# Patient Record
Sex: MALE | Race: WHITE | NOT HISPANIC OR LATINO | Employment: FULL TIME | URBAN - NONMETROPOLITAN AREA
[De-identification: names, ages, dates, MRNs, and addresses within clinical notes are randomized per-mention and may not be internally consistent; named-entity substitution may affect disease eponyms.]

---

## 2019-01-11 ENCOUNTER — HOSPITAL ENCOUNTER (EMERGENCY)
Facility: OTHER | Age: 34
Discharge: HOME | End: 2019-01-11
Attending: PHYSICIAN ASSISTANT | Admitting: PHYSICIAN ASSISTANT

## 2019-01-11 VITALS
HEIGHT: 74 IN | HEART RATE: 113 BPM | TEMPERATURE: 98.2 F | BODY MASS INDEX: 25.67 KG/M2 | RESPIRATION RATE: 17 BRPM | WEIGHT: 200 LBS | SYSTOLIC BLOOD PRESSURE: 114 MMHG | DIASTOLIC BLOOD PRESSURE: 86 MMHG | OXYGEN SATURATION: 98 %

## 2019-01-11 DIAGNOSIS — R31.9 HEMATURIA: ICD-10-CM

## 2019-01-11 DIAGNOSIS — R10.32 ABDOMINAL PAIN, LEFT LOWER QUADRANT: ICD-10-CM

## 2019-01-11 LAB
ALBUMIN SERPL-MCNC: 4.5 G/DL (ref 3.5–5.7)
ALBUMIN UR-MCNC: NEGATIVE MG/DL
ALP SERPL-CCNC: 47 U/L (ref 34–104)
ALT SERPL W P-5'-P-CCNC: 11 U/L (ref 7–52)
ANION GAP SERPL CALCULATED.3IONS-SCNC: 5 MMOL/L (ref 3–14)
APPEARANCE UR: CLEAR
APTT PPP: 40 SEC (ref 29–50)
AST SERPL W P-5'-P-CCNC: 13 U/L (ref 13–39)
BASOPHILS # BLD AUTO: 0.1 10E9/L (ref 0–0.2)
BASOPHILS NFR BLD AUTO: 0.9 %
BILIRUB SERPL-MCNC: 1.2 MG/DL (ref 0.3–1)
BILIRUB UR QL STRIP: NEGATIVE
BUN SERPL-MCNC: 14 MG/DL (ref 7–25)
C TRACH DNA SPEC QL PROBE+SIG AMP: NOT DETECTED
CALCIUM SERPL-MCNC: 9.6 MG/DL (ref 8.6–10.3)
CHLORIDE SERPL-SCNC: 103 MMOL/L (ref 98–107)
CO2 SERPL-SCNC: 33 MMOL/L (ref 21–31)
COLOR UR AUTO: YELLOW
CREAT SERPL-MCNC: 0.93 MG/DL (ref 0.7–1.3)
DIFFERENTIAL METHOD BLD: NORMAL
EOSINOPHIL # BLD AUTO: 0.3 10E9/L (ref 0–0.7)
EOSINOPHIL NFR BLD AUTO: 3.6 %
ERYTHROCYTE [DISTWIDTH] IN BLOOD BY AUTOMATED COUNT: 12 % (ref 10–15)
GFR SERPL CREATININE-BSD FRML MDRD: >90 ML/MIN/{1.73_M2}
GLUCOSE SERPL-MCNC: 93 MG/DL (ref 70–105)
GLUCOSE UR STRIP-MCNC: NEGATIVE MG/DL
HCT VFR BLD AUTO: 50.3 % (ref 40–53)
HGB BLD-MCNC: 17.3 G/DL (ref 13.3–17.7)
HGB UR QL STRIP: NEGATIVE
IMM GRANULOCYTES # BLD: 0 10E9/L (ref 0–0.4)
IMM GRANULOCYTES NFR BLD: 0.3 %
INR PPP: 1.05 (ref 0–1.3)
KETONES UR STRIP-MCNC: NEGATIVE MG/DL
LACTATE SERPL-SCNC: 1.4 MMOL/L (ref 0.5–2.2)
LEUKOCYTE ESTERASE UR QL STRIP: NEGATIVE
LIPASE SERPL-CCNC: 24 U/L (ref 11–82)
LYMPHOCYTES # BLD AUTO: 2.3 10E9/L (ref 0.8–5.3)
LYMPHOCYTES NFR BLD AUTO: 31.2 %
MCH RBC QN AUTO: 31.1 PG (ref 26.5–33)
MCHC RBC AUTO-ENTMCNC: 34.4 G/DL (ref 31.5–36.5)
MCV RBC AUTO: 90 FL (ref 78–100)
MONOCYTES # BLD AUTO: 0.6 10E9/L (ref 0–1.3)
MONOCYTES NFR BLD AUTO: 8.4 %
N GONORRHOEA DNA SPEC QL PROBE+SIG AMP: NOT DETECTED
NEUTROPHILS # BLD AUTO: 4.2 10E9/L (ref 1.6–8.3)
NEUTROPHILS NFR BLD AUTO: 55.6 %
NITRATE UR QL: NEGATIVE
PH UR STRIP: 7 PH (ref 5–9)
PLATELET # BLD AUTO: 241 10E9/L (ref 150–450)
POTASSIUM SERPL-SCNC: 4.2 MMOL/L (ref 3.5–5.1)
PROT SERPL-MCNC: 7.3 G/DL (ref 6.4–8.9)
RBC # BLD AUTO: 5.57 10E12/L (ref 4.4–5.9)
SODIUM SERPL-SCNC: 141 MMOL/L (ref 134–144)
SOURCE: NORMAL
SP GR UR STRIP: 1.01 (ref 1–1.03)
SPECIMEN SOURCE: NORMAL
UROBILINOGEN UR STRIP-ACNC: 0.2 EU/DL (ref 0.2–1)
WBC # BLD AUTO: 7.5 10E9/L (ref 4–11)

## 2019-01-11 PROCEDURE — 87491 CHLMYD TRACH DNA AMP PROBE: CPT | Performed by: PHYSICIAN ASSISTANT

## 2019-01-11 PROCEDURE — 81003 URINALYSIS AUTO W/O SCOPE: CPT | Performed by: PHYSICIAN ASSISTANT

## 2019-01-11 PROCEDURE — 99282 EMERGENCY DEPT VISIT SF MDM: CPT | Mod: Z6 | Performed by: PHYSICIAN ASSISTANT

## 2019-01-11 PROCEDURE — 36415 COLL VENOUS BLD VENIPUNCTURE: CPT | Performed by: PHYSICIAN ASSISTANT

## 2019-01-11 PROCEDURE — 83690 ASSAY OF LIPASE: CPT | Performed by: PHYSICIAN ASSISTANT

## 2019-01-11 PROCEDURE — 99283 EMERGENCY DEPT VISIT LOW MDM: CPT | Performed by: PHYSICIAN ASSISTANT

## 2019-01-11 PROCEDURE — 85610 PROTHROMBIN TIME: CPT | Performed by: PHYSICIAN ASSISTANT

## 2019-01-11 PROCEDURE — 85025 COMPLETE CBC W/AUTO DIFF WBC: CPT | Performed by: PHYSICIAN ASSISTANT

## 2019-01-11 PROCEDURE — 83605 ASSAY OF LACTIC ACID: CPT | Performed by: PHYSICIAN ASSISTANT

## 2019-01-11 PROCEDURE — 85730 THROMBOPLASTIN TIME PARTIAL: CPT | Performed by: PHYSICIAN ASSISTANT

## 2019-01-11 PROCEDURE — 87591 N.GONORRHOEAE DNA AMP PROB: CPT | Performed by: PHYSICIAN ASSISTANT

## 2019-01-11 PROCEDURE — 80053 COMPREHEN METABOLIC PANEL: CPT | Performed by: PHYSICIAN ASSISTANT

## 2019-01-11 ASSESSMENT — ENCOUNTER SYMPTOMS
DIARRHEA: 0
VOMITING: 0
FLANK PAIN: 0
HEMATURIA: 1
NAUSEA: 0
DIFFICULTY URINATING: 0
DYSURIA: 0
ABDOMINAL PAIN: 1

## 2019-01-11 ASSESSMENT — MIFFLIN-ST. JEOR
SCORE: 1911.94
SCORE: 1911.94

## 2019-01-11 NOTE — ED PROVIDER NOTES
"  History     Chief Complaint   Patient presents with     Abdominal Pain     Hematuria     HPI  Lux Sommer is a 33 year old male who presents to the ED today with a chief complaint of abdominal pain and hematuria.  Patient reports these had intermittent abdominal pain located in his left lower quadrant for the past 3 weeks.  Patient reports that for the last week he is noticed a slight amount of blood in his urine as well as in his pants.  Patient reports a similar past history and he was diagnosed with a UTI at that time.  Patient was recently  and reports an increase in alcohol use as well as unprotected sexual intercourse.  Patient would like to be tested for STDs at this time.    Problem List:    There are no active problems to display for this patient.       Past Medical History:    History reviewed. No pertinent past medical history.    Past Surgical History:    History reviewed. No pertinent surgical history.    Family History:    History reviewed. No pertinent family history.    Social History:  Marital Status:   [2]  Social History     Tobacco Use     Smoking status: Current Every Day Smoker     Packs/day: 1.00     Years: 1.00     Pack years: 1.00     Types: Cigarettes     Smokeless tobacco: Never Used   Substance Use Topics     Alcohol use: No     Drug use: Unknown     Types: Other     Comment: Drug use: No        Medications:      No current outpatient medications on file.      Review of Systems   Gastrointestinal: Positive for abdominal pain. Negative for diarrhea, nausea and vomiting.   Genitourinary: Positive for hematuria. Negative for difficulty urinating, dysuria, flank pain, scrotal swelling and testicular pain.   All other systems reviewed and are negative.      Physical Exam   BP: 130/88  Pulse: 113  Heart Rate: 112  Temp: 97.9  F (36.6  C)  Resp: 16  Height: 188 cm (6' 2\")  Weight: 90.7 kg (200 lb)  SpO2: 98 %      Physical Exam   Constitutional: He appears well-developed and " well-nourished. No distress.   HENT:   Head: Normocephalic and atraumatic.   Eyes: Conjunctivae and EOM are normal. Pupils are equal, round, and reactive to light. No scleral icterus.   Neck: Normal range of motion. Neck supple.   Cardiovascular: Normal rate, regular rhythm and normal heart sounds.   No murmur heard.  Pulmonary/Chest: Effort normal and breath sounds normal. No respiratory distress.   Abdominal: Soft. Bowel sounds are normal. There is no tenderness.   Genitourinary: Penis normal. No penile tenderness.   Musculoskeletal: Normal range of motion. He exhibits no deformity.   Lymphadenopathy:     He has no cervical adenopathy.   Neurological: He is alert.   Skin: Skin is warm and dry. No rash noted. He is not diaphoretic.   Psychiatric: He has a normal mood and affect. His behavior is normal. Judgment and thought content normal.       ED Course        Procedures               Critical Care time:  none               Results for orders placed or performed during the hospital encounter of 01/11/19 (from the past 24 hour(s))   CBC with platelets differential   Result Value Ref Range    WBC 7.5 4.0 - 11.0 10e9/L    RBC Count 5.57 4.4 - 5.9 10e12/L    Hemoglobin 17.3 13.3 - 17.7 g/dL    Hematocrit 50.3 40.0 - 53.0 %    MCV 90 78 - 100 fl    MCH 31.1 26.5 - 33.0 pg    MCHC 34.4 31.5 - 36.5 g/dL    RDW 12.0 10.0 - 15.0 %    Platelet Count 241 150 - 450 10e9/L    Diff Method Automated Method     % Neutrophils 55.6 %    % Lymphocytes 31.2 %    % Monocytes 8.4 %    % Eosinophils 3.6 %    % Basophils 0.9 %    % Immature Granulocytes 0.3 %    Absolute Neutrophil 4.2 1.6 - 8.3 10e9/L    Absolute Lymphocytes 2.3 0.8 - 5.3 10e9/L    Absolute Monocytes 0.6 0.0 - 1.3 10e9/L    Absolute Eosinophils 0.3 0.0 - 0.7 10e9/L    Absolute Basophils 0.1 0.0 - 0.2 10e9/L    Abs Immature Granulocytes 0.0 0 - 0.4 10e9/L   INR   Result Value Ref Range    INR 1.05 0 - 1.3   Partial thromboplastin time   Result Value Ref Range    PTT 40  29 - 50 sec   Comprehensive metabolic panel   Result Value Ref Range    Sodium 141 134 - 144 mmol/L    Potassium 4.2 3.5 - 5.1 mmol/L    Chloride 103 98 - 107 mmol/L    Carbon Dioxide 33 (H) 21 - 31 mmol/L    Anion Gap 5 3 - 14 mmol/L    Glucose 93 70 - 105 mg/dL    Urea Nitrogen 14 7 - 25 mg/dL    Creatinine 0.93 0.70 - 1.30 mg/dL    GFR Estimate >90 >60 mL/min/[1.73_m2]    GFR Estimate If Black >90 >60 mL/min/[1.73_m2]    Calcium 9.6 8.6 - 10.3 mg/dL    Bilirubin Total 1.2 (H) 0.3 - 1.0 mg/dL    Albumin 4.5 3.5 - 5.7 g/dL    Protein Total 7.3 6.4 - 8.9 g/dL    Alkaline Phosphatase 47 34 - 104 U/L    ALT 11 7 - 52 U/L    AST 13 13 - 39 U/L   Lipase   Result Value Ref Range    Lipase 24 11 - 82 U/L   Lactic acid   Result Value Ref Range    Lactic Acid 1.4 0.5 - 2.2 mmol/L   *UA reflex to Microscopic   Result Value Ref Range    Color Urine Yellow     Appearance Urine Clear     Glucose Urine Negative NEG^Negative mg/dL    Bilirubin Urine Negative NEG^Negative    Ketones Urine Negative NEG^Negative mg/dL    Specific Gravity Urine 1.010 1.000 - 1.030    Blood Urine Negative NEG^Negative    pH Urine 7.0 5.0 - 9.0 pH    Protein Albumin Urine Negative NEG^Negative mg/dL    Urobilinogen Urine 0.2 0.2 - 1.0 EU/dL    Nitrite Urine Negative NEG^Negative    Leukocyte Esterase Urine Negative NEG^Negative    Source Midstream Urine    GC/Chlamydia by PCR - HI,   Result Value Ref Range    Specimen Source Midstream Urine     Neisseria gonorrhoreae PCR Not Detected NDET^Not Detected    Chlamydia Trachomatis PCR Not Detected NDET^Not Detected       Medications - No data to display    Assessments & Plan (with Medical Decision Making)   Patient seen and examined.  Patient is nontoxic-appearing no acute distress.  Heart, lung, bowel sounds normal.  Abdomen soft nontender palpation, nondistended.  Patient normal-appearing genitalia with no pain to palpation.  Patient was concerned about possible STDs and did report that he recently had  a UTI diagnosed approximately 1/2 years prior.  Patient also reports an increase in alcohol use over the last few months.  Patient will have basic abdominal lab work including urinalysis.    Patient had a fairly unremarkable workup.  Patient had a negative urinalysis for infection.  Gonorrhea chlamydia not detected.  There is no blood seen in patient's urine.  Patient has what appears to be a nonsurgical abdomen at this time.  A referral will be placed for patient follow-up with urology.  Strict return precautions given.  Patient understood and agree with plan patient was discharged.    Stefano Collins PA-C    I have reviewed the nursing notes.    I have reviewed the findings, diagnosis, plan and need for follow up with the patient.          Medication List      There are no discharge medications for this visit.         Final diagnoses:   Abdominal pain, left lower quadrant   Hematuria       1/11/2019   [facility]     Stefano Collins PA  01/11/19 [id number]

## 2019-01-11 NOTE — DISCHARGE INSTRUCTIONS
Get plenty of fluids and rest.  We will call you with results of your outstanding testing.  I recommend that you try conservative treatment such as Tylenol ibuprofen for your abdominal pain.  A referral was placed for you to follow-up with urology next week, keep and attend that appointment.  Return to the ED if your symptoms are worsening.

## 2019-01-11 NOTE — ED AVS SNAPSHOT
[facility]  [address]  [place] [address]  Phone:  [phone]  Fax:  [phone]                                    Lux Sommer   MRN: [id number]    Department:  [facility]   Date of Visit:  1/11/2019           After Visit Summary Signature Page    I have received my discharge instructions, and my questions have been answered. I have discussed any challenges I see with this plan with the nurse or doctor.    ..........................................................................................................................................  Patient/Patient Representative Signature      ..........................................................................................................................................  Patient Representative Print Name and Relationship to Patient    ..................................................               ................................................  Date                                   Time    ..........................................................................................................................................  Reviewed by Signature/Title    ...................................................              ..............................................  Date                                               Time          22EPIC Rev 08/18

## 2019-01-11 NOTE — ED TRIAGE NOTES
Patient arrived to ED with abdominal pain x3 weeks and hematuria x1 week.  Patient states the pain has been intermittent but getting worse. Patient denies painful urination.

## 2019-01-11 NOTE — ED TRIAGE NOTES
Patient complaining of abdominal pain that has been ongoing for 3 weeks in [facility], denies nausea or vomiting.   Patient also complaining of fatigue.

## 2019-01-11 NOTE — ED NOTES
Patient notified that he will be called with his test results as well as his follow up apt.  Pt denies any questions or concerns upon d/c.

## 2019-03-06 ENCOUNTER — HOSPITAL ENCOUNTER (EMERGENCY)
Facility: OTHER | Age: 34
Discharge: HOME | End: 2019-03-06
Attending: FAMILY MEDICINE | Admitting: FAMILY MEDICINE
Payer: OTHER MISCELLANEOUS

## 2019-03-06 VITALS
WEIGHT: 200 LBS | HEIGHT: 74 IN | OXYGEN SATURATION: 98 % | TEMPERATURE: 98.2 F | SYSTOLIC BLOOD PRESSURE: 129 MMHG | DIASTOLIC BLOOD PRESSURE: 82 MMHG | BODY MASS INDEX: 25.67 KG/M2 | RESPIRATION RATE: 15 BRPM

## 2019-03-06 DIAGNOSIS — S61.210A LACERATION OF RIGHT INDEX FINGER WITHOUT FOREIGN BODY WITHOUT DAMAGE TO NAIL, INITIAL ENCOUNTER: ICD-10-CM

## 2019-03-06 PROCEDURE — 99282 EMERGENCY DEPT VISIT SF MDM: CPT | Mod: 25 | Performed by: FAMILY MEDICINE

## 2019-03-06 PROCEDURE — 99282 EMERGENCY DEPT VISIT SF MDM: CPT | Mod: Z6 | Performed by: FAMILY MEDICINE

## 2019-03-06 PROCEDURE — 12001 RPR S/N/AX/GEN/TRNK 2.5CM/<: CPT | Performed by: FAMILY MEDICINE

## 2019-03-06 PROCEDURE — 27210282 ZZH ADHESIVE DERMABOND SKIN: Performed by: FAMILY MEDICINE

## 2019-03-06 PROCEDURE — 12001 RPR S/N/AX/GEN/TRNK 2.5CM/<: CPT | Mod: Z6 | Performed by: FAMILY MEDICINE

## 2019-03-06 ASSESSMENT — ENCOUNTER SYMPTOMS
FEVER: 0
ABDOMINAL PAIN: 0
WOUND: 1
DIFFICULTY URINATING: 0
NECK STIFFNESS: 0
HEADACHES: 0
CONFUSION: 0
EYE REDNESS: 0
SHORTNESS OF BREATH: 0
COLOR CHANGE: 0
ARTHRALGIAS: 0

## 2019-03-06 ASSESSMENT — MIFFLIN-ST. JEOR: SCORE: 1911.94

## 2019-03-06 NOTE — DISCHARGE INSTRUCTIONS
Mr. Sommer,                 Your laceration on your right index finger was repaired with Dermabond. This will peel off on its own in 5-10 days. Avoid any lotions, ointments or soaps to the Dermabond for the next week. Use ibuprofen as needed for pain and swelling.    Sylvain Carcamo, MS3

## 2019-03-06 NOTE — ED AVS SNAPSHOT
[facility]  [address]  [place] [address]  Phone:  [phone]  Fax:  [phone]                                    Lux Sommer   MRN: [id number]    Department:  [facility]   Date of Visit:  3/6/2019           After Visit Summary Signature Page    I have received my discharge instructions, and my questions have been answered. I have discussed any challenges I see with this plan with the nurse or doctor.    ..........................................................................................................................................  Patient/Patient Representative Signature      ..........................................................................................................................................  Patient Representative Print Name and Relationship to Patient    ..................................................               ................................................  Date                                   Time    ..........................................................................................................................................  Reviewed by Signature/Title    ...................................................              ..............................................  Date                                               Time          22EPIC Rev 08/18

## 2019-03-06 NOTE — ED TRIAGE NOTES
"ED Nursing Triage Note (General)   ________________________________    Lux Sommer is a 33 year old Male that presents to triage private car  With history of  Cutting  His hand when he slipped and had a piece of duct work in his hand cutting it reported by patient   Significant symptoms had onset 3 hour(s) ago.  /82   Temp 98.2  F (36.8  C)   Resp 15   Ht 1.88 m (6' 2\")   Wt 90.7 kg (200 lb)   SpO2 98%   BMI 25.68 kg/m  t  Patient appears alert , in mild distress., and cooperative behavior.    GCS Total = 15  Airway: intact  Breathing noted as Normal.  Circulation Normal  Skin normal  Action taken:  To room       PRE HOSPITAL PRIOR LIVING SITUATION Spouse and Children  "

## 2019-03-06 NOTE — ED PROVIDER NOTES
"  History     Chief Complaint   Patient presents with     Laceration     HPI  Lux Sommer is a 33 year old male who presents with a right 2nd digit laceration between the MCP and PIP joints. He states that he was carrying a piece of new ductwork sheet metal when he slipped on the ice and fell. The sheet metal slipped and cut his finger. He states that it bled profusely and he was able to see underlying tissue. Denies any other injuries from the fall.      Allergies:  No Known Allergies    Problem List:    There are no active problems to display for this patient.       Past Medical History:    History reviewed. No pertinent past medical history.    Past Surgical History:    History reviewed. No pertinent surgical history.    Family History:    History reviewed. No pertinent family history.    Social History:  Marital Status:   [2]  Social History     Tobacco Use     Smoking status: Current Every Day Smoker     Packs/day: 0.50     Years: 1.00     Pack years: 0.50     Types: Cigarettes     Smokeless tobacco: Never Used   Substance Use Topics     Alcohol use: Yes     Comment: occasionally 2/week     Drug use: No     Types: Other     Comment: Drug use: No        Medications:      No current outpatient medications on file.      Review of Systems   Constitutional: Negative for fever.   HENT: Negative for congestion.    Eyes: Negative for redness.   Respiratory: Negative for shortness of breath.    Cardiovascular: Negative for chest pain.   Gastrointestinal: Negative for abdominal pain.   Genitourinary: Negative for difficulty urinating.   Musculoskeletal: Negative for arthralgias and neck stiffness.   Skin: Positive for wound. Negative for color change.   Neurological: Negative for headaches.   Psychiatric/Behavioral: Negative for confusion.       Physical Exam   BP: 129/82  Heart Rate: 96  Temp: 98.2  F (36.8  C)  Resp: 15  Height: 188 cm (6' 2\")  Weight: 90.7 kg (200 lb)  SpO2: 98 %      Physical Exam "   Constitutional: He appears well-developed and well-nourished. No distress.   HENT:   Head: Normocephalic and atraumatic.   Eyes: Conjunctivae are normal. No scleral icterus.   Neck: Neck supple.   Pulmonary/Chest: Effort normal.   Abdominal: Soft. There is no tenderness.   Musculoskeletal: He exhibits no deformity.   Full strength and ROM present in 2nd digit of right hand.    Lymphadenopathy:     He has no cervical adenopathy.   Neurological: He is alert.   Skin: Skin is warm and dry. No rash noted. He is not diaphoretic.   2 cm laceration on the right 2nd digit between the MCP and PIP joint. Bleeding controlled. Without evidence of foreign body   Psychiatric: He has a normal mood and affect.       ED Course       SUBJECTIVE:   33 year old male sustained laceration of finger 2 hours ago. Nature of injury: Fell while carrying sheet metal. Tetanus vaccination status reviewed: last tetanus booster within 10 years, tetanus re-vaccination not indicated.     OBJECTIVE:   Patient appears well, vitals are normal. Laceration 2 cm noted.  Description: clean wound edges, no foreign bodies. Neurovascular and tendon structures are intact.    ASSESSMENT:   Laceration as described.    PLAN:   Wound cleansed and irrigated with sterile saline. Laceration edges approximated with dermabond. Wound care instructions provided.  Observe for any signs of infection or other problems.     Critical Care time:  none  No results found for this or any previous visit (from the past 24 hour(s)).    Medications - No data to display    Assessments & Plan (with Medical Decision Making)     I have reviewed the nursing notes.    I have reviewed the findings, diagnosis, plan and need for follow up with the patient.       Medication List      There are no discharge medications for this visit.         Final diagnoses:   Laceration of right index finger without foreign body without damage to nail, initial encounter       Laceration without evidence of  foreign body. Edges closed and approximated with Dermabond. Do not suspect any injuries to tendons or ligaments in the digit with his exam, suspicion for development of future infection is low. Was advised to follow up should any signs or symptoms of infection occur: fevers, purulent drainage, erythema or swelling. Advised to used ibuprofen for pain and swelling.    Sylvain Carcamo, MS3      Note written by Sylvain BURCH3 [facility],  I reviewed it and I concur. In addition patient was seen and examined by myself including both history and physical examination. My findings are reflected in medical student's note above.      3/6/2019   [facility]  03/06/19 [id number]

## 2020-01-04 ENCOUNTER — HOSPITAL ENCOUNTER (EMERGENCY)
Facility: OTHER | Age: 35
Discharge: HOME | End: 2020-01-04
Attending: FAMILY MEDICINE | Admitting: FAMILY MEDICINE

## 2020-01-04 VITALS
BODY MASS INDEX: 25.67 KG/M2 | OXYGEN SATURATION: 98 % | HEIGHT: 74 IN | SYSTOLIC BLOOD PRESSURE: 116 MMHG | HEART RATE: 95 BPM | RESPIRATION RATE: 18 BRPM | TEMPERATURE: 96.7 F | DIASTOLIC BLOOD PRESSURE: 85 MMHG | WEIGHT: 200 LBS

## 2020-01-04 DIAGNOSIS — S60.419A ABRASION OF FINGER WITH INFECTION, INITIAL ENCOUNTER: ICD-10-CM

## 2020-01-04 DIAGNOSIS — L08.9 ABRASION OF FINGER WITH INFECTION, INITIAL ENCOUNTER: ICD-10-CM

## 2020-01-04 PROCEDURE — 99283 EMERGENCY DEPT VISIT LOW MDM: CPT | Mod: Z6 | Performed by: FAMILY MEDICINE

## 2020-01-04 PROCEDURE — 99283 EMERGENCY DEPT VISIT LOW MDM: CPT | Performed by: FAMILY MEDICINE

## 2020-01-04 PROCEDURE — 25000125 ZZHC RX 250: Performed by: FAMILY MEDICINE

## 2020-01-04 PROCEDURE — 25000132 ZZH RX MED GY IP 250 OP 250 PS 637: Performed by: FAMILY MEDICINE

## 2020-01-04 RX ORDER — HYDROCODONE BITARTRATE AND ACETAMINOPHEN 5; 325 MG/1; MG/1
1 TABLET ORAL EVERY 6 HOURS PRN
Qty: 10 TABLET | Refills: 0 | Status: SHIPPED | OUTPATIENT
Start: 2020-01-04 | End: 2020-01-07

## 2020-01-04 RX ORDER — LIDOCAINE HYDROCHLORIDE 10 MG/ML
10 INJECTION, SOLUTION INFILTRATION; PERINEURAL ONCE
Status: COMPLETED | OUTPATIENT
Start: 2020-01-04 | End: 2020-01-04

## 2020-01-04 RX ADMIN — AMOXICILLIN AND CLAVULANATE POTASSIUM 1 TABLET: 875; 125 TABLET, FILM COATED ORAL at 15:22

## 2020-01-04 RX ADMIN — LIDOCAINE HYDROCHLORIDE: 10 INJECTION, SOLUTION EPIDURAL; INFILTRATION; INTRACAUDAL; PERINEURAL at 15:23

## 2020-01-04 ASSESSMENT — MIFFLIN-ST. JEOR: SCORE: 1906.94

## 2020-01-04 NOTE — ED TRIAGE NOTES
"Patient states R index finger \"torn open\" two days ago in a spot full of scar tissue from old injury. Used liquid glue and then today a 1in flap opened up. Is concerned about infection.   "

## 2020-01-04 NOTE — ED AVS SNAPSHOT
[facility]  [address]  [place] [address]  Phone:  [phone]  Fax:  [phone]                                    Lux Sommer   MRN: [id number]    Department:  [facility]   Date of Visit:  1/4/2020           After Visit Summary Signature Page    I have received my discharge instructions, and my questions have been answered. I have discussed any challenges I see with this plan with the nurse or doctor.    ..........................................................................................................................................  Patient/Patient Representative Signature      ..........................................................................................................................................  Patient Representative Print Name and Relationship to Patient    ..................................................               ................................................  Date                                   Time    ..........................................................................................................................................  Reviewed by Signature/Title    ...................................................              ..............................................  Date                                               Time          22EPIC Rev 08/18

## 2020-01-04 NOTE — ED NOTES
Adaptic and kerlix wrapped around finger.  Will take metal finger splint with him, but does not want anything else on at this time.

## 2020-01-04 NOTE — DISCHARGE INSTRUCTIONS
Be sure to keep wound clean and bandaged until healed. Recommend soaking for 20minutes in warm water and re apply bandage daily . Try to avoid direct contact with dirty , contaminated water.If your laceration becomes infected be sure to thoroughly wash laceration after and re bandage. Recommend follow up to recheck wound in clinic next week. If you develop any symptoms of worsening infection you should be reevaluated

## 2020-01-04 NOTE — ED PROVIDER NOTES
History     Chief Complaint   Patient presents with     Hand Injury     Tear to R index finger two days ago that re-opened.      HPI  Lux Sommer is a 34 year old male who presents with concern of possible infection of laceration to right index finger. 2 days ago got caught on heat furnace and tore skin in area of previous injury . Used second skin to glue back together. Thinks has had some drainage. NO swelling . Has had significant increased pain and states he was unable to sleep last night secondary to discomfort.   STates had tetanus shot within the last year although I do not see recorded in [facility] . He can move all joints distal to the injury . Denies numbness. Does not believe chance of foreign body and declines xray .      Allergies:  Not on File    Problem List:    There are no active problems to display for this patient.       Past Medical History:    History reviewed. No pertinent past medical history.    Past Surgical History:    History reviewed. No pertinent surgical history.    Family History:    History reviewed. No pertinent family history.    Social History:  Marital Status:   [2]  Social History     Tobacco Use     Smoking status: Current Every Day Smoker     Packs/day: 0.50     Years: 1.00     Pack years: 0.50     Types: Cigarettes     Smokeless tobacco: Never Used   Substance Use Topics     Alcohol use: Yes     Comment: occasionally 2/week     Drug use: No     Types: Other     Comment: Drug use: No        Medications:    No current outpatient medications on file.        Review of Systems   Constitutional: Negative.  Negative for chills, diaphoresis, fatigue and fever.   HENT: Negative.    Respiratory: Negative.  Negative for shortness of breath.    Cardiovascular: Negative.  Negative for chest pain.   Gastrointestinal: Negative.  Negative for abdominal pain.   Musculoskeletal:        Right index finger pain  , swelling    Neurological: Negative.  Negative for numbness.   All other  "systems reviewed and are negative.      Physical Exam   BP: 116/85  Pulse: 95  Temp: 96.7  F (35.9  C)  Resp: 18  Height: 188 cm (6' 2\")  Weight: 90.7 kg (200 lb)  SpO2: 98 %      Physical Exam  Vitals signs and nursing note reviewed.   Constitutional:       Appearance: Normal appearance.   HENT:      Head: Normocephalic and atraumatic.   Cardiovascular:      Rate and Rhythm: Normal rate and regular rhythm.   Pulmonary:      Effort: Pulmonary effort is normal.      Breath sounds: Normal breath sounds.   Skin:     Findings: Rash present.      Comments: Right index finger with 4 cm laceration . NO bleeding . Tender to touch , edema , NO drainage. CMS intact.    Neurological:      Mental Status: He is alert.      Sensory: No sensory deficit.      Motor: No weakness.   Psychiatric:         Mood and Affect: Mood normal.         ED Course        Procedures          Patient presents to ER for evaluation of concern of infection of right index finger after having cut which he glued himself. Patient triaged to exam room. History and exam completed. Patient with contamination of wound with black substance likely dirt . Finger very tender to touch, edematous. No drainage . CMS intact . Digital block placed after informed consent . Following good analgesia wound soaked, scrubbed , debrided. Patient started on augmentin with fiirst dose given in ER . Patient is  so need to cover broader spectrum organisms than skin monico. Wound bandaged post irrigation and debridement. Discussed importance of keeping wound clean , soaking rebandaging daily . Try to keep hand from contact with contaminated substances. Encouraged patient to consider leave from work until healed however he states that he is unable to do that . Recommend he follow up in clinic for wound recheck next week. Small quantity of vicodin given for severe pain and throbbing. Recommend ibuprofen 600 mg with food four times daily for the next 48 hours then as needed. " Recommend use norco only for severe pain not controlled with ibuprofen        No results found for this or any previous visit (from the past 24 hour(s)).    Medications - No data to display    Assessments & Plan (with Medical Decision Making)     I have reviewed the nursing notes.    I have reviewed the findings, diagnosis, plan and need for follow up with the patient.      New Prescriptions    No medications on file       Final diagnoses:   Abrasion of finger with infection, initial encounter       1/4/2020   [facility] Rachael Luna MD  01/05/20 [id number]

## 2020-01-05 ASSESSMENT — ENCOUNTER SYMPTOMS
NEUROLOGICAL NEGATIVE: 1
CARDIOVASCULAR NEGATIVE: 1
RESPIRATORY NEGATIVE: 1
CONSTITUTIONAL NEGATIVE: 1
FATIGUE: 0
CHILLS: 0
ABDOMINAL PAIN: 0
DIAPHORESIS: 0
NUMBNESS: 0
FEVER: 0
GASTROINTESTINAL NEGATIVE: 1
SHORTNESS OF BREATH: 0

## 2020-08-14 ENCOUNTER — ALLIED HEALTH/NURSE VISIT (OUTPATIENT)
Dept: FAMILY MEDICINE | Facility: OTHER | Age: 35
End: 2020-08-14
Payer: OTHER GOVERNMENT

## 2020-08-14 DIAGNOSIS — R05.9 COUGH: Primary | ICD-10-CM

## 2020-08-14 PROCEDURE — 99207 ZZC NO CHARGE NURSE ONLY: CPT

## 2020-08-14 PROCEDURE — C9803 HOPD COVID-19 SPEC COLLECT: HCPCS

## 2020-08-14 PROCEDURE — U0003 INFECTIOUS AGENT DETECTION BY NUCLEIC ACID (DNA OR RNA); SEVERE ACUTE RESPIRATORY SYNDROME CORONAVIRUS 2 (SARS-COV-2) (CORONAVIRUS DISEASE [COVID-19]), AMPLIFIED PROBE TECHNIQUE, MAKING USE OF HIGH THROUGHPUT TECHNOLOGIES AS DESCRIBED BY CMS-2020-01-R: HCPCS | Mod: ZL

## 2020-08-16 LAB
SARS-COV-2 RNA SPEC QL NAA+PROBE: NOT DETECTED
SPECIMEN SOURCE: NORMAL

## 2020-08-17 ENCOUNTER — TELEPHONE (OUTPATIENT)
Dept: FAMILY MEDICINE | Facility: OTHER | Age: 35
End: 2020-08-17

## 2020-08-17 NOTE — TELEPHONE ENCOUNTER
Contacted Pt and relayed negative results/information for COVID-19 test as per letter.  Advised Pt to call back with further questions/worsening of symptoms/no improvement. The patient indicates understanding of these issues and agrees with the plan.      Jenelle Sunshine RN  ....................  8/17/2020   4:04 PM

## 2020-08-17 NOTE — TELEPHONE ENCOUNTER
Patient is calling because he wants someone to give him his COVID test results. He does not want to wait for a letter nor does he want to set up [employer].     I did advise that we do not call with results unless he has a positive test and that he would receive a letter in the mail serving as his official note for work. - He did not like that.     Please review and return call.

## 2023-01-17 ENCOUNTER — OFFICE VISIT (OUTPATIENT)
Dept: FAMILY MEDICINE | Facility: OTHER | Age: 38
End: 2023-01-17
Attending: FAMILY MEDICINE

## 2023-01-17 VITALS
DIASTOLIC BLOOD PRESSURE: 72 MMHG | HEART RATE: 112 BPM | BODY MASS INDEX: 25.94 KG/M2 | TEMPERATURE: 97.6 F | SYSTOLIC BLOOD PRESSURE: 124 MMHG | OXYGEN SATURATION: 96 % | WEIGHT: 202 LBS | RESPIRATION RATE: 18 BRPM

## 2023-01-17 DIAGNOSIS — J01.90 ACUTE SINUSITIS WITH SYMPTOMS > 10 DAYS: Primary | ICD-10-CM

## 2023-01-17 PROCEDURE — 99213 OFFICE O/P EST LOW 20 MIN: CPT | Performed by: FAMILY MEDICINE

## 2023-01-17 ASSESSMENT — PAIN SCALES - GENERAL: PAINLEVEL: MILD PAIN (2)

## 2023-01-17 NOTE — PROGRESS NOTES
Assessment & Plan     1. Acute sinusitis with symptoms > 10 days  Acute, worsening/prolonged symptoms without improvement.  Start abx therapy.  Supportive cares to continue.  If no improvement, consider CT sinuses.  Follow up prn.  - amoxicillin-clavulanate (AUGMENTIN) 875-125 MG tablet; Take 1 tablet by mouth 2 times daily for 10 days  Dispense: 20 tablet; Refill: 0    MDM:  Prescription drug management    Nicotine/Tobacco Cessation:  He reports that he has been smoking cigarettes. He has a 0.50 pack-year smoking history. He has never used smokeless tobacco.  Nicotine/Tobacco Cessation Plan:   Information offered: Patient not interested at this time    No follow-ups on file.    Ro Landis, [facility] AND \[facility] [place] [facility]\""        Laurent Wasserman is a 37 year old accompanied by his daughter, presenting for the following health issues:  Sinus Problem      Sinus Problem     History of Present Illness       Reason for visit:  Sinus infection  Symptom onset:  More than a month  Symptoms include:  Facial pressure, pain and migraines  Symptom intensity:  Moderate  Symptom progression:  Worsening  Had these symptoms before:  No  What makes it worse:  Lying down  What makes it better:  Showering and fresh air    He eats 2-3 servings of fruits and vegetables daily.He consumes 3 sweetened beverage(s) daily.He exercises with enough effort to increase his heart rate 60 or more minutes per day.  He exercises with enough effort to increase his heart rate 7 days per week.   He is taking medications regularly.       Has always had sinus issues; but seem to be worsening as he gets older.  Feels worse if he is more active with his RUE; or if a sebaceous cyst that is on his R maxilla/R side of nose is more swollen.  Has used some sudafed with some improvement; but not consistent improvement.  Overall worsening over the last one month.  URI started symptoms.  No prior sinus surgeries.        Objective    /72    Pulse 112   Temp 97.6  F (36.4  C) (Tympanic)   Resp 18   Wt 91.6 kg (202 lb)   SpO2 96%   BMI 25.94 kg/m    Body mass index is 25.94 kg/m .  Physical Exam   GENERAL: healthy, alert and no distress  EYES: Eyes grossly normal to inspection, PERRL and conjunctivae and sclerae normal  HENT: ear canals and TM's normal, nose has small sebaceous cyst on R side/maxilla  and mouth without ulcers or lesions.  Congested turbinates.  NECK: no adenopathy, no asymmetry, masses, or scars and thyroid normal to palpation  RESP: lungs clear to auscultation - no rales, rhonchi or wheezes  CV: regular rate and rhythm, normal S1 S2, no S3 or S4, no murmur, click or rub, no peripheral edema and peripheral pulses strong  MS: no gross musculoskeletal defects noted, no edema  SKIN: no suspicious lesions or rashes  PSYCH: mentation appears normal, affect normal/bright    No results found for any visits on 01/17/23.

## 2023-01-17 NOTE — PATIENT INSTRUCTIONS
Sinusitis (Antibiotic Treatment)    The sinuses are air-filled spaces within the bones of the face. They connect to the inside of the nose. Sinusitis is an inflammation of the tissue that lines the sinuses. Sinusitis can occur during a cold. It can also happen due to allergies to pollens and other particles in the air. Sinusitis can cause symptoms of sinus congestion and a feeling of fullness. A sinus infection causes fever, headache, and facial pain. There is often green or yellow fluid draining from the nose or into the back of the throat (post-nasal drip). You have been given antibiotics to treat this condition.   Home care    Take the full course of antibiotics as instructed. Don't stop taking them, even when you feel better.    Drink plenty of water, hot tea, and other liquids as directed by the healthcare provider. This may help thin nasal mucus. It also may help your sinuses drain fluids.    Heat may help soothe painful areas of your face. Use a towel soaked in hot water. Or,  the shower and direct the warm spray onto your face. Using a vaporizer along with a menthol rub at night may also help soothe symptoms.     An expectorant with guaifenesin may help thin nasal mucus and help your sinuses drain fluids. Talk with your provider or pharmacists before taking an over-the-counter (OTC) medicine if you have any questions about it or its side effects..    You can use an OTC decongestant, unless a similar medicine was prescribed to you. Nasal sprays work the fastest. Use one that contains phenylephrine or oxymetazoline. First blow your nose gently. Then use the spray. Don't use these medicines more often than directed on the label. If you do, your symptoms may get worse. You may also take pills that contain pseudoephedrine. Don t use products that combine multiple medicines. This is because side effects may be increased. Read labels. You can also ask the pharmacist for help. (People with high blood  pressure should not use decongestants. They can raise blood pressure.) Talk with your provider or pharmacist if you have any questions about the medicine..    OTC antihistamines may help if allergies contributed to your sinusitis. Talk with your provider or pharmacist if you have any questions about the medicine..    Don't use nasal rinses or irrigation during an acute sinus infection, unless your healthcare provider tells you to. Rinsing may spread the infection to other areas in your sinuses.    Use acetaminophen or ibuprofen to control pain, unless another pain medicine was prescribed to you. If you have chronic liver or kidney disease or ever had a stomach ulcer, talk with your healthcare provider before using these medicines. Never give aspirin to anyone under age 18 who is ill with a fever. It may cause severe liver damage.    Don't smoke. This can make symptoms worse.    Follow-up care  Follow up with your healthcare provider, or as advised.   When to seek medical advice  Call your healthcare provider if any of these occur:     Facial pain or headache that gets worse    Stiff neck    Unusual drowsiness or confusion    Swelling of your forehead or eyelids    Symptoms don't go away in 10 days    Vision problems, such as blurred or double vision    Fever of 100.4 F (38 C) or higher, or as directed by your healthcare provider  Call 911  Call 911 if any of these occur:     Seizure    Trouble breathing    Feeling dizzy or faint    Fingernails, skin or lips look blue, purple , or gray  Prevention  Here are steps you can take to help prevent an infection:     Keep good hand washing habits.    Don t have close contact with people who have sore throats, colds, or other upper respiratory infections.    Don t smoke, and stay away from secondhand smoke.    Stay up to date with of your vaccines.  [employer] last reviewed this educational content on 12/1/2019 2000-2021 [employer]. All rights reserved. This  information is not intended as a substitute for professional medical care. Always follow your healthcare professional's instructions.

## 2023-01-17 NOTE — NURSING NOTE
"Chief Complaint   Patient presents with     Sinus Problem       Initial /72   Pulse 112   Temp 97.6  F (36.4  C) (Tympanic)   Resp 18   Wt 91.6 kg (202 lb)   SpO2 96%   BMI 25.94 kg/m   Estimated body mass index is 25.94 kg/m  as calculated from the following:    Height as of 1/4/20: 1.88 m (6' 2\").    Weight as of this encounter: 91.6 kg (202 lb).  Medication Reconciliation: complete    Leanna Potts LPN     [facility] Care Directive reviewed.     "

## 2024-08-19 ENCOUNTER — HOSPITAL ENCOUNTER (OUTPATIENT)
Dept: CT IMAGING | Facility: OTHER | Age: 39
Discharge: HOME | End: 2024-08-19
Attending: FAMILY MEDICINE | Admitting: FAMILY MEDICINE
Payer: COMMERCIAL

## 2024-08-19 DIAGNOSIS — J32.9 CHRONIC SINUSITIS: ICD-10-CM

## 2024-08-19 PROCEDURE — 70486 CT MAXILLOFACIAL W/O DYE: CPT

## (undated) RX ORDER — LIDOCAINE HYDROCHLORIDE 10 MG/ML
INJECTION, SOLUTION EPIDURAL; INFILTRATION; INTRACAUDAL; PERINEURAL
Status: DISPENSED
Start: 2020-01-04